# Patient Record
Sex: MALE | Race: WHITE | NOT HISPANIC OR LATINO | URBAN - METROPOLITAN AREA
[De-identification: names, ages, dates, MRNs, and addresses within clinical notes are randomized per-mention and may not be internally consistent; named-entity substitution may affect disease eponyms.]

---

## 2023-02-06 ENCOUNTER — TELEPHONE (OUTPATIENT)
Dept: UROLOGY | Facility: CLINIC | Age: 63
End: 2023-02-06
Payer: COMMERCIAL

## 2023-02-06 NOTE — TELEPHONE ENCOUNTER
Pt reported to ER 2/5 for retention had catheter placed. Sched for 2/14 for NP visit/catheter removal. Lpn

## 2023-02-06 NOTE — TELEPHONE ENCOUNTER
----- Message from Alaina Calles sent at 2/6/2023  2:52 PM CST -----  Type:  Needs Medical Advice    Who Called: sodium,    Symptoms (please be specific):     How long has patient had these symptoms:  -  Pharmacy name and phone #:  -  Would the patient rather a call back or a response via [facility]? -  Best Call Back Number: [phone]    Additional Information:  pt needs to schedule nurse visit  to have cathter removed, please call

## 2023-02-14 ENCOUNTER — TELEPHONE (OUTPATIENT)
Dept: UROLOGY | Facility: CLINIC | Age: 63
End: 2023-02-14

## 2023-02-14 ENCOUNTER — OFFICE VISIT (OUTPATIENT)
Dept: UROLOGY | Facility: CLINIC | Age: 63
End: 2023-02-14
Payer: COMMERCIAL

## 2023-02-14 VITALS — WEIGHT: 255 LBS | HEIGHT: 74 IN | RESPIRATION RATE: 18 BRPM | BODY MASS INDEX: 32.73 KG/M2

## 2023-02-14 DIAGNOSIS — R33.9 URINARY RETENTION: Primary | ICD-10-CM

## 2023-02-14 PROCEDURE — 99204 OFFICE O/P NEW MOD 45 MIN: CPT | Mod: S$GLB,,, | Performed by: UROLOGY

## 2023-02-14 PROCEDURE — 99204 PR OFFICE/OUTPT VISIT, NEW, LEVL IV, 45-59 MIN: ICD-10-PCS | Mod: S$GLB,,, | Performed by: UROLOGY

## 2023-02-14 RX ORDER — CIPROFLOXACIN 500 MG/1
500 TABLET ORAL 2 TIMES DAILY
COMMUNITY
Start: 2023-02-06

## 2023-02-14 RX ORDER — TAMSULOSIN HYDROCHLORIDE 0.4 MG/1
1 CAPSULE ORAL DAILY
COMMUNITY
Start: 2022-02-20

## 2023-02-14 NOTE — PROGRESS NOTES
Subjective:       Patient ID: Saúl Herrmann is a 62 y.o. male.    Chief Complaint: New Pt, Urinary Retention, and Indwelling Mclean      HPI:  62-year-old male with BPH history of elevated PSA had a negative biopsy by CD prior to COVID he has a significant family history of prostate cancer both his brothers in his father  from it he recently had a prostate biopsy in [facility] fusion he drank a lot over the weekend and went into urinary retention he is here for voiding trial    Past Medical History:   Past Medical History:   Diagnosis Date    BPH (benign prostatic hyperplasia)     Elevated PSA     Urinary retention        Past Surgical Historical:   Past Surgical History:   Procedure Laterality Date    PROSTATE BIOPSY          Medications:   Medication List with Changes/Refills   Current Medications    CIPROFLOXACIN HCL (CIPRO) 500 MG TABLET    Take 500 mg by mouth 2 (two) times daily.    TAMSULOSIN (FLOMAX) 0.4 MG CAP    Take 1 capsule by mouth once daily.        Past Social History:   Social History     Socioeconomic History    Marital status: Unknown   Tobacco Use    Smoking status: Never    Smokeless tobacco: Never   Substance and Sexual Activity    Alcohol use: Yes       Allergies: Review of patient's allergies indicates:  No Known Allergies     Family History:   Family History   Problem Relation Age of Onset    Prostate cancer Father     Prostate cancer Brother     Prostate cancer Brother         Review of Systems:  Review of Systems   Constitutional:  Negative for activity change and appetite change.   HENT:  Negative for congestion and dental problem.    Eyes:  Negative for visual disturbance.   Respiratory:  Negative for chest tightness and shortness of breath.    Cardiovascular:  Negative for chest pain.   Gastrointestinal:  Negative for abdominal distention and abdominal pain.   Endocrine: Negative for polyuria.   Genitourinary:  Negative for difficulty urinating, dysuria, flank pain, frequency,  hematuria, penile discharge, penile pain, penile swelling, scrotal swelling, testicular pain and urgency.   Musculoskeletal:  Negative for back pain and neck pain.   Skin:  Negative for color change.   Allergic/Immunologic: Positive for immunocompromised state.   Neurological:  Negative for dizziness.   Hematological:  Negative for adenopathy.   Psychiatric/Behavioral:  Negative for agitation, behavioral problems and confusion.      Physical Exam:  Physical Exam  Constitutional:       General: He is not in acute distress.     Appearance: He is well-developed.   HENT:      Head: Normocephalic and atraumatic.      Nose: Nose normal.   Eyes:      General: No scleral icterus.     Conjunctiva/sclera: Conjunctivae normal.      Pupils: Pupils are equal, round, and reactive to light.   Neck:      Thyroid: No thyromegaly.      Trachea: No tracheal deviation.   Cardiovascular:      Rate and Rhythm: Normal rate and regular rhythm.      Heart sounds: Normal heart sounds.   Pulmonary:      Effort: Pulmonary effort is normal. No respiratory distress.      Breath sounds: Normal breath sounds. No wheezing or rales.   Abdominal:      General: Bowel sounds are normal. There is no distension.      Palpations: Abdomen is soft.      Tenderness: There is no abdominal tenderness. There is no guarding or rebound.   Genitourinary:     Penis: Normal. No tenderness.       Prostate: Normal.   Musculoskeletal:         General: No deformity. Normal range of motion.      Cervical back: Neck supple.   Lymphadenopathy:      Cervical: No cervical adenopathy.   Skin:     General: Skin is warm and dry.      Findings: No erythema or rash.   Neurological:      Mental Status: He is alert and oriented to person, place, and time.      Cranial Nerves: No cranial nerve deficit.   Psychiatric:         Behavior: Behavior normal.       Assessment/Plan:       Problem List Items Addressed This Visit    None  Visit Diagnoses       Urinary retention    -  Primary                PSA elevation:  Patient is managed currently in [place] for this     Urinary retention we will perform a voiding trial patient is return to clinic if he is unable to urinate

## 2023-02-14 NOTE — TELEPHONE ENCOUNTER
Pt states he is urinating post pulling catheter at office visit this morning. Pt educated to proceed to ER if unable to urinate and call us as needed.     ----- Message from Nga Coronado sent at 2/14/2023  2:21 PM CST -----  Contact: self  Type:  Patient Returning Call    Who Called:Saúl Herrmann    Who Left Message for Patient:aubrie  Does the patient know what this is regarding?:appt  Would the patient rather a call back or a response via [facility]? Call back  Best Call Back Number:[phone]    Additional Information: n/a

## 2023-02-15 ENCOUNTER — TELEPHONE (OUTPATIENT)
Dept: UROLOGY | Facility: CLINIC | Age: 63
End: 2023-02-15
Payer: COMMERCIAL

## 2023-02-15 NOTE — TELEPHONE ENCOUNTER
----- Message from Alaina Calles sent at 2/15/2023 11:18 AM CST -----  Type:  Needs Medical Advice    Who Called: Dorinda FIELD   Symptoms (please be specific): -   How long has patient had these symptoms:  -  Pharmacy name and phone #:  -  Would the patient rather a call back or a response via [facility]?    Best Call Back Number: [phone]  Additional Information: please re-fax [facility] paperwork to  [phone]

## 2024-04-04 ENCOUNTER — OFFICE VISIT (OUTPATIENT)
Dept: UROLOGY | Facility: CLINIC | Age: 64
End: 2024-04-04
Payer: COMMERCIAL

## 2024-04-04 VITALS — WEIGHT: 255 LBS | HEIGHT: 74 IN | BODY MASS INDEX: 32.73 KG/M2

## 2024-04-04 DIAGNOSIS — R97.20 ELEVATED PSA: ICD-10-CM

## 2024-04-04 DIAGNOSIS — R39.15 URGENCY OF URINATION: ICD-10-CM

## 2024-04-04 DIAGNOSIS — R35.0 FREQUENCY OF URINATION: Primary | ICD-10-CM

## 2024-04-04 LAB
BILIRUBIN, UA POC OHS: NEGATIVE
BLOOD, UA POC OHS: NEGATIVE
CLARITY, UA POC OHS: CLEAR
COLOR, UA POC OHS: YELLOW
GLUCOSE, UA POC OHS: NEGATIVE
KETONES, UA POC OHS: NEGATIVE
LEUKOCYTES, UA POC OHS: NEGATIVE
NITRITE, UA POC OHS: NEGATIVE
PH, UA POC OHS: 7
PROTEIN, UA POC OHS: NEGATIVE
SPECIFIC GRAVITY, UA POC OHS: 1.02
UROBILINOGEN, UA POC OHS: 0.2

## 2024-04-04 PROCEDURE — 99214 OFFICE O/P EST MOD 30 MIN: CPT | Mod: S$GLB,,, | Performed by: UROLOGY

## 2024-04-04 PROCEDURE — 81003 URINALYSIS AUTO W/O SCOPE: CPT | Mod: QW,S$GLB,, | Performed by: UROLOGY

## 2024-04-05 LAB — PSA, DIAGNOSTIC: 6.7 NG/ML (ref 0.1–4)

## 2024-04-08 NOTE — PROGRESS NOTES
Subjective:       Patient ID: Saúl Herrmann is a 63 y.o. male.    Chief Complaint: No chief complaint on file.      HPI: 63-year-old male patient presenting to the clinic to follow up for elevated PSA.  Patient has a family history of prostate cancer.  According to the patient's chart he had a negative prostate biopsy 2 years ago with [facility].  Most recent PSA with our clinic was 2 years ago which resulted at 5.3.  The patient also states he is on testosterone replacement at this time and has not had a PSA checked recently.    He also complains of a painful knot on his left testicle.  He had a recent ultrasound from [facility] however we do not have access to this report.  Patient has a history of left inguinal hernia repair.       Past Medical History:   Past Medical History:   Diagnosis Date    BPH (benign prostatic hyperplasia)     Elevated PSA     Urinary retention        Past Surgical Historical:   Past Surgical History:   Procedure Laterality Date    HERNIA REPAIR      PROSTATE BIOPSY          Medications:   Medication List with Changes/Refills   Current Medications    CIPROFLOXACIN HCL (CIPRO) 500 MG TABLET    Take 500 mg by mouth 2 (two) times daily.    TAMSULOSIN (FLOMAX) 0.4 MG CAP    Take 1 capsule by mouth once daily.        Past Social History:   Social History     Socioeconomic History    Marital status: Unknown   Tobacco Use    Smoking status: Never    Smokeless tobacco: Never   Substance and Sexual Activity    Alcohol use: Yes       Allergies: Review of patient's allergies indicates:  No Known Allergies     Family History:   Family History   Problem Relation Age of Onset    Prostate cancer Father     Prostate cancer Paternal Uncle     Prostate cancer Brother     Prostate cancer Brother         Review of Systems:  Review of Systems   Constitutional: Negative.    HENT: Negative.     Eyes: Negative.    Respiratory: Negative.     Cardiovascular: Negative.    Gastrointestinal: Negative.    Endocrine:  Negative.    Genitourinary:  Positive for testicular pain.   Musculoskeletal: Negative.    Skin: Negative.    Allergic/Immunologic: Negative.    Neurological: Negative.    Hematological: Negative.    Psychiatric/Behavioral: Negative.         Physical Exam:  Physical Exam  Constitutional:       Appearance: He is normal weight.   HENT:      Head: Normocephalic.      Nose: Nose normal.      Mouth/Throat:      Mouth: Mucous membranes are moist.      Pharynx: Oropharynx is clear.   Eyes:      Extraocular Movements: Extraocular movements intact.      Conjunctiva/sclera: Conjunctivae normal.      Pupils: Pupils are equal, round, and reactive to light.   Cardiovascular:      Rate and Rhythm: Normal rate and regular rhythm.      Pulses: Normal pulses.      Heart sounds: Normal heart sounds.   Pulmonary:      Effort: Pulmonary effort is normal.      Breath sounds: Normal breath sounds.   Abdominal:      General: Abdomen is flat. Bowel sounds are normal.      Palpations: Abdomen is soft.      Hernia: There is no hernia in the right inguinal area or left inguinal area.   Genitourinary:     Penis: Normal. No phimosis, paraphimosis, hypospadias, erythema, tenderness or discharge.       Testes: Normal.         Right: Mass, tenderness or swelling not present. Right testis is descended. Cremasteric reflex is present.          Left: Mass, tenderness or swelling not present. Left testis is descended. Cremasteric reflex is present.       Prostate: Normal.      Rectum: Normal.   Musculoskeletal:         General: Normal range of motion.      Cervical back: Normal range of motion and neck supple.   Lymphadenopathy:      Lower Body: No right inguinal adenopathy. No left inguinal adenopathy.   Skin:     General: Skin is warm and dry.      Capillary Refill: Capillary refill takes less than 2 seconds.   Neurological:      General: No focal deficit present.      Mental Status: He is alert and oriented to person, place, and time. Mental status  is at baseline.   Psychiatric:         Mood and Affect: Mood normal.         Behavior: Behavior normal.         Thought Content: Thought content normal.         Judgment: Judgment normal.         Assessment/Plan:     Left testicular pain:  We will obtain scrotal ultrasound from Dr. Theodore.  Instructed patient to go to the ER if his symptoms are worsening    Elevated PSA with a family history of prostate cancer:  Patient currently on TRT.  We will draw the patient's PSA notify him of results when they are received    I, Dr. Nathan Snow have seen and personally evaluated the patient. I have formulated the plan reviewed all pertinent imaging and clinical data.  I agree with the nurse practitioner's assessment, and I have personally formulated the plan for this patient's care as described by the midlevel.  Problem List Items Addressed This Visit    None  Visit Diagnoses       Frequency of urination    -  Primary    Relevant Orders    POCT Urinalysis(Instrument) (Completed)    Elevated PSA        Relevant Orders    Prostate Specific Antigen, Diagnostic (Completed)    Urgency of urination        Relevant Orders    POCT Urinalysis(Instrument) (Completed)

## 2024-04-09 ENCOUNTER — TELEPHONE (OUTPATIENT)
Dept: UROLOGY | Facility: CLINIC | Age: 64
End: 2024-04-09
Payer: COMMERCIAL

## 2024-04-09 NOTE — TELEPHONE ENCOUNTER
Spoke with patient in regards to PSA results. Verified patient's provider in [place]; Dr. Lobito Medina located at [facility]. Patient verbalized understanding.       ----- Message from Belinda Combs NP sent at 4/7/2024  7:20 PM CDT -----  Please notify patient of PSA result of 6.70. 's note states that he is monitored by a provider in [place] for this issue. Please confirm

## 2024-05-07 ENCOUNTER — TELEPHONE (OUTPATIENT)
Dept: UROLOGY | Facility: CLINIC | Age: 64
End: 2024-05-07
Payer: COMMERCIAL

## 2024-05-07 NOTE — TELEPHONE ENCOUNTER
Spoke with pt to inform him that Dr. Snow reviewed his Scrotal US done 02/29/24. No concerning findings, and informed pt if he is in pain we will need to get a more recent US. Pt states he see's Dr. Lobito garza/ [facility] whom follows his PSA's,, I will send the psa results, and scrotal us report to his office, and pt will get a f/u scheduled with him.

## 2024-05-28 ENCOUNTER — OFFICE VISIT (OUTPATIENT)
Dept: UROLOGY | Facility: CLINIC | Age: 64
End: 2024-05-28
Payer: COMMERCIAL

## 2024-05-28 VITALS
OXYGEN SATURATION: 96 % | HEIGHT: 74 IN | BODY MASS INDEX: 33.37 KG/M2 | WEIGHT: 260 LBS | DIASTOLIC BLOOD PRESSURE: 90 MMHG | SYSTOLIC BLOOD PRESSURE: 144 MMHG | HEART RATE: 84 BPM

## 2024-05-28 DIAGNOSIS — N40.1 BPH WITH URINARY OBSTRUCTION: ICD-10-CM

## 2024-05-28 DIAGNOSIS — R33.9 URINARY RETENTION: Primary | ICD-10-CM

## 2024-05-28 DIAGNOSIS — N13.8 BPH WITH URINARY OBSTRUCTION: ICD-10-CM

## 2024-05-28 PROCEDURE — 99214 OFFICE O/P EST MOD 30 MIN: CPT | Mod: S$GLB,,,

## 2024-05-28 NOTE — PROGRESS NOTES
Subjective:       Patient ID: Saúl Herrmann is a 63 y.o. male.    Chief Complaint: Benign Prostatic Hypertrophy      HPI: 63-year-old male established patient presents today for ER follow-up from urinary retention.  Patient reports on Friday he was heavily drinking alcohol celebrating the holiday and developed urinary retention.  He went to the emergency room at [facility] ER and they inserted a Mclean catheter.  He is here today to have the Mclean cath removed.  Patient reports this has happened 1 additional time several years ago after a day of heavily drinking alcohol.  He does have a history of BPH with obstruction maintained on Flomax daily.    Patient also has a history of elevated PSA levels however he has had 2 biopsies in the past both of which were benign.  His most recent PSA level was 6.26 per the patient.  Patient does report that prior to this incident with urinary retention that he was experiencing some urinary urgency but denies frequency, weak stream, straining to void, split/spraying stream.  Patient also reports that his PSA levels remain slightly elevated due to testosterone replacement therapy.       Past Medical History:   Past Medical History:   Diagnosis Date    BPH (benign prostatic hyperplasia)     Elevated PSA     Urinary retention        Past Surgical Historical:   Past Surgical History:   Procedure Laterality Date    HERNIA REPAIR      PROSTATE BIOPSY          Medications:   Medication List with Changes/Refills   Current Medications    CIPROFLOXACIN HCL (CIPRO) 500 MG TABLET    Take 500 mg by mouth 2 (two) times daily.    TAMSULOSIN (FLOMAX) 0.4 MG CAP    Take 1 capsule by mouth once daily.        Past Social History:   Social History     Socioeconomic History    Marital status: Unknown   Tobacco Use    Smoking status: Never     Passive exposure: Never    Smokeless tobacco: Never   Substance and Sexual Activity    Alcohol use: Yes       Allergies: Review of patient's allergies  indicates:  No Known Allergies     Family History:   Family History   Problem Relation Name Age of Onset    Prostate cancer Father      Prostate cancer Paternal Uncle      Prostate cancer Brother      Prostate cancer Brother          Review of Systems:  Review of Systems   Constitutional: Negative.    HENT: Negative.     Eyes: Negative.    Respiratory: Negative.     Cardiovascular: Negative.    Gastrointestinal: Negative.    Endocrine: Negative.    Genitourinary: Negative.    Musculoskeletal: Negative.    Skin: Negative.    Allergic/Immunologic: Negative.    Neurological: Negative.    Hematological: Negative.    Psychiatric/Behavioral: Negative.       Physical Exam:  Physical Exam  Constitutional:       Appearance: Normal appearance.   Cardiovascular:      Rate and Rhythm: Normal rate.   Pulmonary:      Effort: Pulmonary effort is normal.   Abdominal:      General: Bowel sounds are normal.      Palpations: Abdomen is soft.   Genitourinary:     Penis: Normal.       Comments: Sixteen Bulgarian Mclean cath draining to leg bag without difficulty  Neurological:      Mental Status: He is alert and oriented to person, place, and time.     Assessment/Plan:     Urinary retention/BPH with obstruction: We will perform a voiding trial today and if he passes we will remove the Mclean catheter.  Instructed patient that if he is unable to urinate by 6:00 p.m. this evening that he is to return to the emergency room to have the Mclean cath reinserted as it will be after hours here in the clinic.  We did discuss possible cystoscopy in the future due to patient's ongoing urinary complaints despite Flomax.  I am hesitant to start patient on finasteride at this time due to his history of elevated PSA levels as well as his testosterone replacement therapy due to Proscars effects on PSA levels.  Instructed patient to notify clinic if his urinary retention reoccurs and we will set patient up for cystoscopy with Dr. Snow     Follow up as  needed  Problem List Items Addressed This Visit    None  Visit Diagnoses       Urinary retention    -  Primary    BPH with urinary obstruction

## 2024-06-03 ENCOUNTER — OFFICE VISIT (OUTPATIENT)
Dept: UROLOGY | Facility: CLINIC | Age: 64
End: 2024-06-03
Payer: COMMERCIAL

## 2024-06-03 VITALS
HEIGHT: 74 IN | BODY MASS INDEX: 33.37 KG/M2 | WEIGHT: 260 LBS | DIASTOLIC BLOOD PRESSURE: 99 MMHG | SYSTOLIC BLOOD PRESSURE: 157 MMHG

## 2024-06-03 DIAGNOSIS — R33.9 URINARY RETENTION: Primary | ICD-10-CM

## 2024-06-03 DIAGNOSIS — Z97.8 FOLEY CATHETER IN PLACE: ICD-10-CM

## 2024-06-03 PROCEDURE — 99214 OFFICE O/P EST MOD 30 MIN: CPT | Mod: S$GLB,,, | Performed by: NURSE PRACTITIONER

## 2024-06-03 NOTE — PROGRESS NOTES
Subjective:       Patient ID: Saúl Herrmann is a 63 y.o. male.    Chief Complaint: No chief complaint on file.      HPI: 63-year-old male, established patient, presents with urinary retention.    She went to the emergency room yesterday.  He had a Mclean catheter put in due to urinary retention.    He was given a shot of Rocephin, which patient had a slight reaction to.  He was also given Cipro orally, however patient has yet to start Cipro.  He was also instructed to increase his Flomax.    Patient was seen 1 week ago on 05/28/2024 for urinary retention.    At that time he attributed his retention due to alcohol call consumption over the holiday weekend.  On 05/20/2024 patient did a voiding trial and was successful.    However, he started having issues again yesterday requiring he will go to the emergency room and have a Mclean catheter put in place.      No other urinary complaints at this time.         Past Medical History:   Past Medical History:   Diagnosis Date    BPH (benign prostatic hyperplasia)     Elevated PSA     Urinary retention        Past Surgical Historical:   Past Surgical History:   Procedure Laterality Date    HERNIA REPAIR      PROSTATE BIOPSY          Medications:   Medication List with Changes/Refills   Current Medications    CIPROFLOXACIN HCL (CIPRO) 500 MG TABLET    Take 500 mg by mouth 2 (two) times daily.    TAMSULOSIN (FLOMAX) 0.4 MG CAP    Take 1 capsule by mouth once daily.        Past Social History:   Social History     Socioeconomic History    Marital status: Unknown   Tobacco Use    Smoking status: Never     Passive exposure: Never    Smokeless tobacco: Never   Substance and Sexual Activity    Alcohol use: Yes       Allergies: Review of patient's allergies indicates:  No Known Allergies     Family History:   Family History   Problem Relation Name Age of Onset    Prostate cancer Father      Prostate cancer Paternal Uncle      Prostate cancer Brother      Prostate cancer Brother           Review of Systems:  Review of Systems   Constitutional:  Negative for activity change and appetite change.   HENT:  Negative for congestion and dental problem.    Eyes:  Negative for visual disturbance.   Respiratory:  Negative for chest tightness and shortness of breath.    Cardiovascular:  Negative for chest pain.   Gastrointestinal:  Negative for abdominal distention and abdominal pain.   Genitourinary:  Negative for decreased urine volume, difficulty urinating, dysuria, enuresis, flank pain, frequency, genital sores, hematuria, penile discharge, penile pain, penile swelling, scrotal swelling, testicular pain and urgency.   Musculoskeletal:  Negative for back pain and neck pain.   Skin:  Negative for color change.   Neurological:  Negative for dizziness.   Hematological:  Negative for adenopathy.   Psychiatric/Behavioral:  Negative for agitation, behavioral problems and confusion.        Physical Exam:  Physical Exam  Vitals and nursing note reviewed.   Constitutional:       Appearance: He is well-developed.   HENT:      Head: Normocephalic.   Eyes:      Pupils: Pupils are equal, round, and reactive to light.   Cardiovascular:      Rate and Rhythm: Normal rate and regular rhythm.      Heart sounds: Normal heart sounds.   Pulmonary:      Effort: Pulmonary effort is normal.      Breath sounds: Normal breath sounds.   Abdominal:      General: Bowel sounds are normal.      Palpations: Abdomen is soft.   Musculoskeletal:         General: Normal range of motion.      Cervical back: Normal range of motion and neck supple.   Skin:     General: Skin is warm and dry.   Neurological:      Mental Status: He is alert and oriented to person, place, and time.   Psychiatric:         Behavior: Behavior normal.         Assessment/Plan:   Urinary retention/Mclean catheter:  Patient has 2nd episode of urinary retention in 7 day time span.    Patient will increase Flomax from 0.4 mg daily to 0.8 mg daily.    Patient will start  Cipro as directed by ER physician.    He has a urine culture pending through the ER.    Did discuss cysto and possible need for TURP.  Stated understanding.  Patient is going out of town on Thursday of this week.  He will come in Thursday morning to do a voiding trial.      Voiding trial in 3 days.    Follow-up appointment in 2 weeks   Problem List Items Addressed This Visit    None  Visit Diagnoses       Urinary retention    -  Primary    Mclean catheter in place

## 2024-06-06 ENCOUNTER — CLINICAL SUPPORT (OUTPATIENT)
Dept: UROLOGY | Facility: CLINIC | Age: 64
End: 2024-06-06
Payer: COMMERCIAL

## 2024-06-06 DIAGNOSIS — R33.9 URINARY RETENTION: Primary | ICD-10-CM

## 2024-06-06 DIAGNOSIS — Z97.8 FOLEY CATHETER IN PLACE: ICD-10-CM

## 2024-06-06 PROCEDURE — 51700 IRRIGATION OF BLADDER: CPT | Mod: S$GLB,,,

## 2024-06-06 NOTE — PROGRESS NOTES
Using aseptic technique voiding trial per ANGUS HERNANDEZ. Leg bag removed, instilled 180cc's sterile H20 into bladder, pt reports he was full and begin to leak around aragon. Bulb deflated and removed aragon holding beaker under pt to catch drainage. Instructed pt to void but it was coming out without effort of pt. Orange fluid in beaker apox 200cc's. I advised I would let provider know and seek her advice.    Spoke with ANGUS HERNANDEZ. She stated pt could go, if unable to void today he is to return to clinic early this afternoon for a new aragon then a cystoscopy would be ordered.  If he goes to ED after hours and has aragon put back in he should notify us so cystoscopy could be ordered. I explained to pt what provider advised, to drink plenty of fluids when he leaves here and notify us of any issues. Pt agreed and reports he needed to go to restroom now. I showed pt where restroom was and advised again he could call us later if needed.

## 2024-06-07 ENCOUNTER — TELEPHONE (OUTPATIENT)
Dept: UROLOGY | Facility: CLINIC | Age: 64
End: 2024-06-07
Payer: COMMERCIAL

## 2024-06-07 NOTE — TELEPHONE ENCOUNTER
Pt states [place] paperwork was faxed. I informed pt we did not receive. He will have it faxed again Monday. Pt states he will return to work on 6/17.    ----- Message from Marleni Mclaughlin MA sent at 6/6/2024  5:13 PM CDT -----  Contact: self    ----- Message -----  From: Serena Kwong LPN  Sent: 6/6/2024   5:06 PM CDT  To: Marleni Mclaughlin MA      ----- Message -----  From: Nataly Navarrete  Sent: 6/6/2024   9:33 AM CDT  To: Gwendolyn Evans Staff    Patient is requesting a call back regarding [facility] paperwork. Please call back at [phone]

## 2024-06-10 NOTE — PROGRESS NOTES
Using aseptic pt in room prepared to dc catheter. Instilled 80 cc sterile H2O, was unable to put anymore in, pt had severe leakage, bulb deflated immediately. Aragon removed and pt notified per provider he could go home and advised if he was unable to void he could come back unless after hours than he could report to ED.     Note: Unable to measure instilled sterile water due to leakage around aragon.

## 2024-06-11 ENCOUNTER — OFFICE VISIT (OUTPATIENT)
Dept: UROLOGY | Facility: CLINIC | Age: 64
End: 2024-06-11
Payer: COMMERCIAL

## 2024-06-11 VITALS
DIASTOLIC BLOOD PRESSURE: 72 MMHG | HEIGHT: 74 IN | BODY MASS INDEX: 33.36 KG/M2 | SYSTOLIC BLOOD PRESSURE: 138 MMHG | WEIGHT: 259.94 LBS | HEART RATE: 68 BPM

## 2024-06-11 DIAGNOSIS — N40.1 BPH WITH URINARY OBSTRUCTION: Primary | ICD-10-CM

## 2024-06-11 DIAGNOSIS — N13.8 BPH WITH URINARY OBSTRUCTION: Primary | ICD-10-CM

## 2024-06-11 LAB
BILIRUBIN, UA POC OHS: NEGATIVE
BLOOD, UA POC OHS: NEGATIVE
CLARITY, UA POC OHS: CLEAR
COLOR, UA POC OHS: YELLOW
GLUCOSE, UA POC OHS: NEGATIVE
KETONES, UA POC OHS: NEGATIVE
LEUKOCYTES, UA POC OHS: NEGATIVE
NITRITE, UA POC OHS: NEGATIVE
PH, UA POC OHS: 5.5
PROTEIN, UA POC OHS: NEGATIVE
SPECIFIC GRAVITY, UA POC OHS: >=1.03
UROBILINOGEN, UA POC OHS: 0.2

## 2024-06-11 PROCEDURE — 99214 OFFICE O/P EST MOD 30 MIN: CPT | Mod: 25,S$GLB,,

## 2024-06-11 PROCEDURE — 81003 URINALYSIS AUTO W/O SCOPE: CPT | Mod: QW,S$GLB,,

## 2024-06-11 NOTE — PROGRESS NOTES
Subjective:       Patient ID: Saúl Herrmann is a 63 y.o. male.    Chief Complaint: No chief complaint on file.      HPI: 63-year-old male established patient presents for follow up due to urinary retention.  Patient previously required an indwelling Mclean cath due to significant urinary retention.  After 7 days on Flomax we performed a voiding trial in clinic.  Patient was able to urinate on his own for 4 days however on day 5 he returned to the emergency room due to urinary retention requiring another indwelling Mclean catheter.  On 06/06 patient passed his voiding trial in the clinic.  Today patient reports he is still having some urinary urgency, frequency, and a very weak stream.  Patient reports his symptoms are intermittent at times they are worse than others.  Patient has a known history of BPH and has been suggested in the past to undergo a cystoscopy to evaluate for TURP however at that time patient refused.  Patient would like to have that discussion today.  His PVR today is 11 mL       Past Medical History:   Past Medical History:   Diagnosis Date    BPH (benign prostatic hyperplasia)     Elevated PSA     Urinary retention        Past Surgical Historical:   Past Surgical History:   Procedure Laterality Date    HERNIA REPAIR      PROSTATE BIOPSY          Medications:   Medication List with Changes/Refills   Current Medications    CIPROFLOXACIN HCL (CIPRO) 500 MG TABLET    Take 500 mg by mouth 2 (two) times daily.    TAMSULOSIN (FLOMAX) 0.4 MG CAP    Take 1 capsule by mouth once daily.        Past Social History:   Social History     Socioeconomic History    Marital status: Unknown   Tobacco Use    Smoking status: Never     Passive exposure: Never    Smokeless tobacco: Never   Substance and Sexual Activity    Alcohol use: Yes       Allergies: Review of patient's allergies indicates:  No Known Allergies     Family History:   Family History   Problem Relation Name Age of Onset    Prostate cancer Father       Prostate cancer Paternal Uncle      Prostate cancer Brother      Prostate cancer Brother          Review of Systems:  Review of Systems   Genitourinary:  Positive for frequency and urgency.        Weak urinary stream     Physical Exam:  Physical Exam  Constitutional:       Appearance: Normal appearance.   Cardiovascular:      Rate and Rhythm: Normal rate.   Pulmonary:      Effort: Pulmonary effort is normal.   Abdominal:      General: Bowel sounds are normal.      Palpations: Abdomen is soft.   Genitourinary:     Comments: Deferred TANYA  Neurological:      Mental Status: He is alert and oriented to person, place, and time.   Urinalysis: Negative     PVR 11 mL  Assessment/Plan:     BPH with obstruction: We will set patient up for cystoscopy to evaluate if he is a candidate for TURP     Follow up to be arranged after cystoscopy  Problem List Items Addressed This Visit    None  Visit Diagnoses       BPH with urinary obstruction    -  Primary

## 2024-07-02 ENCOUNTER — PROCEDURE VISIT (OUTPATIENT)
Dept: UROLOGY | Facility: CLINIC | Age: 64
End: 2024-07-02
Payer: COMMERCIAL

## 2024-07-02 VITALS
WEIGHT: 267 LBS | RESPIRATION RATE: 14 BRPM | OXYGEN SATURATION: 96 % | SYSTOLIC BLOOD PRESSURE: 170 MMHG | HEART RATE: 72 BPM | BODY MASS INDEX: 34.28 KG/M2 | DIASTOLIC BLOOD PRESSURE: 103 MMHG

## 2024-07-02 DIAGNOSIS — N40.1 BPH WITH URINARY OBSTRUCTION: ICD-10-CM

## 2024-07-02 DIAGNOSIS — N13.8 BPH WITH URINARY OBSTRUCTION: ICD-10-CM

## 2024-07-02 PROCEDURE — 52000 CYSTOURETHROSCOPY: CPT | Mod: S$GLB,,, | Performed by: UROLOGY

## 2024-07-02 RX ORDER — PHENAZOPYRIDINE HYDROCHLORIDE 200 MG/1
200 TABLET, FILM COATED ORAL
COMMUNITY
Start: 2024-06-02

## 2024-07-02 RX ORDER — TAMSULOSIN HYDROCHLORIDE 0.4 MG/1
0.8 CAPSULE ORAL DAILY
Qty: 180 CAPSULE | Refills: 3 | Status: SHIPPED | OUTPATIENT
Start: 2024-07-02 | End: 2025-07-02

## 2024-07-02 NOTE — PROCEDURES
Cystoscopy    Date/Time: 7/2/2024 3:00 PM    Performed by: Nathan Snow MD  Authorized by: Dali Hou NP    Timeout: prior to procedure the correct patient, procedure, and site was verified    Prep: patient was prepped and draped in usual sterile fashion    Anesthesia:  Intraurethral instillation  Indications: hematuria    Position:  Supine  Anesthesia:  Intraurethral instillation  Patient sedated?: No    Preparation: Patient was prepped and draped in usual sterile fashion    Scope type:  Flexible cystoscope  External exam normal: Yes    Urethra normal: Yes    Prostate normal: Yes    Comments:      Patient was brought to the procedure room placed on the table padded prepped and draped in usual sterile fashion in supine position. The cystoscope was inserted into the urethra and advanced the urethra was normal prostate showed expected enlargement for patient's age, the bladder is entered and inspected is found to be free of tumor stone or foreign body.  Bilateral ureteral orifices were identified and noted to be normal in appearance with clear efflux of urine at this point the scope was removed the patient tolerated the procedure well there were no complications    Impression:   Patient does have an enlarged prostate with a significant median lobe however if he is not overly bothered by his symptoms we will hold off.  Patient has been in retention in the past if this happens again I recommend TURP

## 2024-07-02 NOTE — PATIENT INSTRUCTIONS
Patient Education       Cystoscopy Discharge Instructions   About this topic   Your kidneys make urine. It is stored in your bladder. The urethra is a tube at the bottom of the bladder. Urine flows out of this tube. Sometimes, there is a blockage and urine is not able to leave the body.  A cystoscopy is a procedure that lets the doctor see the inside of your bladder and urethra. The doctor does it to:  Look for stones or tumors blocking the bladder and urethra  Look for changes or injury inside the bladder  Take a tissue sample from the inside of your bladder  Look for reasons for blood in the urine, pain with urination, or why you are passing urine often  Look for prostate problems     What care is needed at home?   Ask your doctor what you need to do when you go home. Make sure you ask questions if you do not understand what the doctor says. This way you will know what you need to do.  Take a warm bath or use a warm wet washcloth over the opening to the urethra. This will help to ease any pain. Do this as needed.  Drink 6 to 8 glasses of water a day and 3 to 4 glasses in the first few hours after the procedure to flush out your bladder and reduce irritation.  You may see some blood in your urine for a few days. This is normal.  Empty your bladder as soon as you feel the need to. Don't delay going to the bathroom. It stretches and weakens the bladder.  What follow-up care is needed?   Your doctor may ask you to make visits to the office to check on your progress. Be sure to keep these visits.  If you had a biopsy, talk with your doctor about the results.  What drugs may be needed?   The doctor may order drugs to:  Help with pain  Fight an infection  Help with bladder spasms  Will physical activity be limited?   Talk to your doctor about when you may go back to your normal activities like work, driving, or sex.  What problems could happen?   Bleeding  Infection  Injury to the bladder and urethra  Discomfort in the  urethra area  Burning sensation for a short time  Upset stomach  When do I need to call the doctor?   Signs of infection. These include a fever of 100.4°F (38°C) or higher, chills, pain with passing urine.  Pain that does not go away even with drugs or that lasts longer than 2 days  Too much blood in your urine  Passing large dime-sized clots  Cloudy urine  Little or no urine or not able to pass urine  Abdominal pain and nausea  Teach Back: Helping You Understand   The Teach Back Method helps you understand the information we are giving you. After you talk with the staff, tell them in your own words what you learned. This helps to make sure the staff has described each thing clearly. It also helps to explain things that may have been confusing. Before going home, make sure you can do these:  I can tell you about my procedure.  I can tell you what may help ease my pain.  I can tell you what I will do if I have a fever, chills, or am not able to pass urine.  Where can I learn more?   [employer]  https://www.cancer.org/treatment/understanding-your-diagnosis/tests/endoscopy/cystoscopy.html   [employer]  https://www.cancerresearchuk.org/about-cancer/bladder-cancer/getting-diagnosed/tests-diagnose/cystoscopy   [facility] Choices  http://www.nhs.uk/conditions/Cystoscopy/Pages/Introduction.aspx   Last Reviewed Date   2021-04-22  Consumer Information Use and Disclaimer   This information is not specific medical advice and does not replace information you receive from your health care provider. This is only a brief summary of general information. It does NOT include all information about conditions, illnesses, injuries, tests, procedures, treatments, therapies, discharge instructions or life-style choices that may apply to you. You must talk with your health care provider for complete information about your health and treatment options. This information should not be used to decide whether or not to accept your  health care providers advice, instructions or recommendations. Only your health care provider has the knowledge and training to provide advice that is right for you.  Copyright   Copyright © 2021 [employer] and its affiliates and/or licensors. All rights reserved.